# Patient Record
Sex: FEMALE | Race: WHITE | NOT HISPANIC OR LATINO | URBAN - METROPOLITAN AREA
[De-identification: names, ages, dates, MRNs, and addresses within clinical notes are randomized per-mention and may not be internally consistent; named-entity substitution may affect disease eponyms.]

---

## 2022-04-04 ENCOUNTER — APPOINTMENT (RX ONLY)
Dept: URBAN - METROPOLITAN AREA CLINIC 54 | Facility: CLINIC | Age: 87
Setting detail: DERMATOLOGY
End: 2022-04-04

## 2022-04-04 DIAGNOSIS — L82.1 OTHER SEBORRHEIC KERATOSIS: ICD-10-CM

## 2022-04-04 DIAGNOSIS — L57.8 OTHER SKIN CHANGES DUE TO CHRONIC EXPOSURE TO NONIONIZING RADIATION: ICD-10-CM

## 2022-04-04 DIAGNOSIS — L81.4 OTHER MELANIN HYPERPIGMENTATION: ICD-10-CM

## 2022-04-04 PROCEDURE — 99203 OFFICE O/P NEW LOW 30 MIN: CPT

## 2022-04-04 PROCEDURE — ? ADDITIONAL NOTES

## 2022-04-04 PROCEDURE — ? COUNSELING

## 2022-04-04 PROCEDURE — ? SUNSCREEN RECOMMENDATIONS

## 2022-04-04 ASSESSMENT — LOCATION ZONE DERM: LOCATION ZONE: ARM

## 2022-04-04 ASSESSMENT — LOCATION DETAILED DESCRIPTION DERM: LOCATION DETAILED: RIGHT PROXIMAL DORSAL FOREARM

## 2022-04-04 ASSESSMENT — LOCATION SIMPLE DESCRIPTION DERM: LOCATION SIMPLE: RIGHT FOREARM

## 2022-04-04 NOTE — PROCEDURE: MIPS QUALITY
Quality 130: Documentation Of Current Medications In The Medical Record: Current Medications Documented
Quality 226: Preventive Care And Screening: Tobacco Use: Screening And Cessation Intervention: Patient screened for tobacco use and is an ex/non-smoker
Detail Level: Generalized
Quality 431: Preventive Care And Screening: Unhealthy Alcohol Use - Screening: Patient not identified as an unhealthy alcohol user when screened for unhealthy alcohol use using a systematic screening method

## 2023-03-30 ENCOUNTER — APPOINTMENT (RX ONLY)
Dept: URBAN - METROPOLITAN AREA CLINIC 54 | Facility: CLINIC | Age: 88
Setting detail: DERMATOLOGY
End: 2023-03-30

## 2023-03-30 DIAGNOSIS — L82.1 OTHER SEBORRHEIC KERATOSIS: ICD-10-CM

## 2023-03-30 DIAGNOSIS — L81.4 OTHER MELANIN HYPERPIGMENTATION: ICD-10-CM

## 2023-03-30 PROCEDURE — ? FULL BODY SKIN EXAM

## 2023-03-30 PROCEDURE — ? ADDITIONAL NOTES

## 2023-03-30 PROCEDURE — ? COUNSELING

## 2023-03-30 PROCEDURE — ? SUNSCREEN RECOMMENDATIONS

## 2023-03-30 PROCEDURE — 99213 OFFICE O/P EST LOW 20 MIN: CPT

## 2023-03-30 NOTE — PROCEDURE: MIPS QUALITY
Quality 130: Documentation Of Current Medications In The Medical Record: Current Medications Documented
Quality 226: Preventive Care And Screening: Tobacco Use: Screening And Cessation Intervention: Patient screened for tobacco use and is an ex/non-smoker
Detail Level: Generalized
Quality 110: Preventive Care And Screening: Influenza Immunization: Influenza Immunization Administered during Influenza season
Quality 431: Preventive Care And Screening: Unhealthy Alcohol Use - Screening: Patient not identified as an unhealthy alcohol user when screened for unhealthy alcohol use using a systematic screening method

## 2023-07-26 ENCOUNTER — APPOINTMENT (RX ONLY)
Dept: URBAN - METROPOLITAN AREA CLINIC 54 | Facility: CLINIC | Age: 88
Setting detail: DERMATOLOGY
End: 2023-07-26

## 2023-07-26 DIAGNOSIS — L85.3 XEROSIS CUTIS: ICD-10-CM

## 2023-07-26 DIAGNOSIS — L57.0 ACTINIC KERATOSIS: ICD-10-CM

## 2023-07-26 PROCEDURE — ? LIQUID NITROGEN

## 2023-07-26 PROCEDURE — ? COUNSELING

## 2023-07-26 PROCEDURE — 99212 OFFICE O/P EST SF 10 MIN: CPT | Mod: 25

## 2023-07-26 PROCEDURE — ? FULL BODY SKIN EXAM - DECLINED

## 2023-07-26 PROCEDURE — 17003 DESTRUCT PREMALG LES 2-14: CPT

## 2023-07-26 PROCEDURE — ? ADDITIONAL NOTES

## 2023-07-26 PROCEDURE — 17000 DESTRUCT PREMALG LESION: CPT

## 2023-07-26 PROCEDURE — ? PRESCRIPTION

## 2023-07-26 RX ORDER — AMMONIUM LACTATE 120 MG/G
CREAM TOPICAL BID
Qty: 385 | Refills: 6 | Status: ERX | COMMUNITY
Start: 2023-07-26

## 2023-07-26 RX ADMIN — AMMONIUM LACTATE: 120 CREAM TOPICAL at 00:00

## 2023-07-26 ASSESSMENT — LOCATION SIMPLE DESCRIPTION DERM
LOCATION SIMPLE: LEFT ANTERIOR NECK
LOCATION SIMPLE: CHEST

## 2023-07-26 ASSESSMENT — LOCATION DETAILED DESCRIPTION DERM
LOCATION DETAILED: LEFT INFERIOR LATERAL NECK
LOCATION DETAILED: UPPER STERNUM
LOCATION DETAILED: LEFT CLAVICULAR NECK
LOCATION DETAILED: LEFT INFERIOR ANTERIOR NECK
LOCATION DETAILED: LEFT MEDIAL SUPERIOR CHEST

## 2023-07-26 ASSESSMENT — LOCATION ZONE DERM
LOCATION ZONE: TRUNK
LOCATION ZONE: NECK

## 2023-07-26 NOTE — PROCEDURE: LIQUID NITROGEN
Duration Of Freeze Thaw-Cycle (Seconds): 0
Post-Care Instructions: I reviewed with the patient in detail post-care instructions. Patient is to wear sunprotection, and avoid picking at any of the treated lesions. Pt may apply Vaseline to crusted or scabbing areas.
Number Of Freeze-Thaw Cycles: 1 freeze-thaw cycle
Render Note In Bullet Format When Appropriate: No
Show Aperture Variable?: Yes
Consent: The patient's consent was obtained including but not limited to risks of crusting, scabbing, blistering, scarring, darker or lighter pigmentary change, recurrence, incomplete removal and infection.
Detail Level: Detailed

## 2024-04-24 ENCOUNTER — APPOINTMENT (RX ONLY)
Dept: URBAN - METROPOLITAN AREA CLINIC 54 | Facility: CLINIC | Age: 89
Setting detail: DERMATOLOGY
End: 2024-04-24

## 2024-04-24 DIAGNOSIS — D22 MELANOCYTIC NEVI: ICD-10-CM | Status: STABLE

## 2024-04-24 DIAGNOSIS — L85.3 XEROSIS CUTIS: ICD-10-CM | Status: STABLE

## 2024-04-24 DIAGNOSIS — L81.4 OTHER MELANIN HYPERPIGMENTATION: ICD-10-CM | Status: STABLE

## 2024-04-24 DIAGNOSIS — L82.1 OTHER SEBORRHEIC KERATOSIS: ICD-10-CM | Status: STABLE

## 2024-04-24 PROBLEM — D22.5 MELANOCYTIC NEVI OF TRUNK: Status: ACTIVE | Noted: 2024-04-24

## 2024-04-24 PROCEDURE — ? SUNSCREEN RECOMMENDATIONS

## 2024-04-24 PROCEDURE — 99213 OFFICE O/P EST LOW 20 MIN: CPT

## 2024-04-24 PROCEDURE — ? COUNSELING

## 2024-04-24 PROCEDURE — ? FULL BODY SKIN EXAM

## 2024-04-24 PROCEDURE — ? SCREENING FOR COVID-19

## 2024-04-24 ASSESSMENT — LOCATION SIMPLE DESCRIPTION DERM
LOCATION SIMPLE: LEFT UPPER BACK
LOCATION SIMPLE: LEFT CALF
LOCATION SIMPLE: RIGHT UPPER BACK
LOCATION SIMPLE: ABDOMEN

## 2024-04-24 ASSESSMENT — LOCATION ZONE DERM
LOCATION ZONE: LEG
LOCATION ZONE: TRUNK

## 2024-04-24 ASSESSMENT — LOCATION DETAILED DESCRIPTION DERM
LOCATION DETAILED: PERIUMBILICAL SKIN
LOCATION DETAILED: LEFT MID-UPPER BACK
LOCATION DETAILED: LEFT DISTAL CALF
LOCATION DETAILED: RIGHT MID-UPPER BACK

## 2025-05-21 ENCOUNTER — APPOINTMENT (OUTPATIENT)
Dept: URBAN - METROPOLITAN AREA CLINIC 54 | Facility: CLINIC | Age: OVER 89
Setting detail: DERMATOLOGY
End: 2025-05-21

## 2025-05-21 DIAGNOSIS — L82.1 OTHER SEBORRHEIC KERATOSIS: ICD-10-CM | Status: STABLE

## 2025-05-21 DIAGNOSIS — L81.4 OTHER MELANIN HYPERPIGMENTATION: ICD-10-CM | Status: STABLE

## 2025-05-21 DIAGNOSIS — L85.3 XEROSIS CUTIS: ICD-10-CM | Status: STABLE

## 2025-05-21 DIAGNOSIS — D22 MELANOCYTIC NEVI: ICD-10-CM | Status: STABLE

## 2025-05-21 PROBLEM — D22.5 MELANOCYTIC NEVI OF TRUNK: Status: ACTIVE | Noted: 2025-05-21

## 2025-05-21 PROCEDURE — ? TREATMENT REGIMEN

## 2025-05-21 PROCEDURE — ? SCREENING FOR COVID-19

## 2025-05-21 PROCEDURE — ? FULL BODY SKIN EXAM

## 2025-05-21 PROCEDURE — ? COUNSELING

## 2025-05-21 PROCEDURE — 99213 OFFICE O/P EST LOW 20 MIN: CPT

## 2025-05-21 ASSESSMENT — LOCATION DETAILED DESCRIPTION DERM
LOCATION DETAILED: LEFT MID-UPPER BACK
LOCATION DETAILED: LEFT DISTAL CALF
LOCATION DETAILED: RIGHT MID-UPPER BACK
LOCATION DETAILED: PERIUMBILICAL SKIN

## 2025-05-21 ASSESSMENT — LOCATION ZONE DERM
LOCATION ZONE: TRUNK
LOCATION ZONE: LEG

## 2025-05-21 ASSESSMENT — LOCATION SIMPLE DESCRIPTION DERM
LOCATION SIMPLE: RIGHT UPPER BACK
LOCATION SIMPLE: LEFT UPPER BACK
LOCATION SIMPLE: LEFT CALF
LOCATION SIMPLE: ABDOMEN

## 2025-05-21 NOTE — PROCEDURE: REASSURANCE
Hide Include Location In Plan Question?: No
Detail Level: Zone
Additional Note: Reassured benign and normal findings.